# Patient Record
Sex: FEMALE | URBAN - METROPOLITAN AREA
[De-identification: names, ages, dates, MRNs, and addresses within clinical notes are randomized per-mention and may not be internally consistent; named-entity substitution may affect disease eponyms.]

---

## 2023-07-07 ENCOUNTER — APPOINTMENT (RX ONLY)
Dept: URBAN - METROPOLITAN AREA CLINIC 74 | Facility: CLINIC | Age: 53
Setting detail: DERMATOLOGY
End: 2023-07-07

## 2023-07-07 DIAGNOSIS — L30.8 OTHER SPECIFIED DERMATITIS: ICD-10-CM | Status: INADEQUATELY CONTROLLED

## 2023-07-07 DIAGNOSIS — L90.8 OTHER ATROPHIC DISORDERS OF SKIN: ICD-10-CM

## 2023-07-07 PROCEDURE — ? COUNSELING

## 2023-07-07 PROCEDURE — 99203 OFFICE O/P NEW LOW 30 MIN: CPT

## 2023-07-07 PROCEDURE — ? ORDER TESTS

## 2023-07-07 PROCEDURE — ? TREATMENT REGIMEN

## 2023-07-07 ASSESSMENT — LOCATION SIMPLE DESCRIPTION DERM
LOCATION SIMPLE: INFERIOR FOREHEAD
LOCATION SIMPLE: RIGHT HEEL

## 2023-07-07 ASSESSMENT — LOCATION DETAILED DESCRIPTION DERM
LOCATION DETAILED: RIGHT HEEL
LOCATION DETAILED: INFERIOR MID FOREHEAD

## 2023-07-07 ASSESSMENT — LOCATION ZONE DERM
LOCATION ZONE: FEET
LOCATION ZONE: FACE

## 2023-07-07 NOTE — PROCEDURE: ORDER TESTS
Bill For Surgical Tray: no
Billing Type: Third-Party Bill
Expected Date Of Service: 07/07/2023
Performing Laboratory: 0
Billing Type: Third-Party Bill

## 2023-07-07 NOTE — HPI: SKIN LESIONS
Is This A New Presentation, Or A Follow-Up?: Skin Lesions
How Severe Is Your Skin Lesion?: moderate
Additional History: Patient presents for several sores across her face that have not healed for over a month. The sores are very itchy and painful. Pt says she has had this once before but not this severe. Pt reports sores started as a pimples which she popped. Thick clear pus oozed out and stuck to her skin. Pt PCP prescribed cipro which helped stop the spread of the sores. The sores scabbed up and were itchy so pt itched scab off and went to the ER where she was prescribed Bactrim and mupirocin. Pt wants sores cultured.

## 2023-07-07 NOTE — PROCEDURE: COUNSELING
Patient Specific Counseling (Will Not Stick From Patient To Patient): -pt last used mupirocin this morning, pt wants cx but was advised cx may not show anything due to recent mupirocin use\\n-per pt cipro didn’t take redness out but stopped growth/spread of sores\\n-pt has tried NewSkin with no improvement, advised to try biafine\\n-pt thinks mites or parasites are causing condition, advised that dermatologically there is no mite/parasite that could cause this
Detail Level: Detailed
Patient Specific Counseling (Will Not Stick From Patient To Patient): -pt concerned that heel needs to be drained and that there is some sort of parasite/worm in there, advised that this is just the pedal papules themselves.\\n-pt insisted on bacterial culture

## 2023-07-13 ENCOUNTER — RX ONLY (OUTPATIENT)
Age: 53
Setting detail: RX ONLY
End: 2023-07-13

## 2023-07-13 RX ORDER — EMOLLIENT COMBINATION NO.10
EMULSION (GRAM) TOPICAL
Qty: 45 | Refills: 1 | Status: ERX | COMMUNITY
Start: 2023-07-13

## 2023-08-04 ENCOUNTER — APPOINTMENT (RX ONLY)
Dept: URBAN - METROPOLITAN AREA CLINIC 74 | Facility: CLINIC | Age: 53
Setting detail: DERMATOLOGY
End: 2023-08-04

## 2023-08-04 ENCOUNTER — RX ONLY (OUTPATIENT)
Age: 53
Setting detail: RX ONLY
End: 2023-08-04

## 2023-08-04 DIAGNOSIS — L30.8 OTHER SPECIFIED DERMATITIS: ICD-10-CM

## 2023-08-04 PROCEDURE — ? SEPARATE AND IDENTIFIABLE DOCUMENTATION

## 2023-08-04 PROCEDURE — 11104 PUNCH BX SKIN SINGLE LESION: CPT

## 2023-08-04 PROCEDURE — ? COUNSELING

## 2023-08-04 PROCEDURE — ? PATIENT SPECIFIC COUNSELING

## 2023-08-04 PROCEDURE — ? BIOPSY BY PUNCH METHOD

## 2023-08-04 PROCEDURE — ? PRESCRIPTION

## 2023-08-04 PROCEDURE — 99214 OFFICE O/P EST MOD 30 MIN: CPT | Mod: 25

## 2023-08-04 RX ORDER — METRONIDAZOLE 500 MG/1
TABLET ORAL
Qty: 28 | Refills: 0 | Status: ERX | COMMUNITY
Start: 2023-08-04

## 2023-08-04 RX ORDER — IVERMECTIN 10 MG/G
CREAM TOPICAL
Qty: 45 | Refills: 0 | Status: ERX | COMMUNITY
Start: 2023-08-04

## 2023-08-04 RX ADMIN — METRONIDAZOLE: 500 TABLET ORAL at 00:00

## 2023-08-04 ASSESSMENT — LOCATION SIMPLE DESCRIPTION DERM
LOCATION SIMPLE: GLABELLA
LOCATION SIMPLE: RIGHT CHEEK
LOCATION SIMPLE: LEFT CHEEK
LOCATION SIMPLE: LEFT ZYGOMA

## 2023-08-04 ASSESSMENT — LOCATION DETAILED DESCRIPTION DERM
LOCATION DETAILED: LEFT CENTRAL ZYGOMA
LOCATION DETAILED: LEFT LATERAL MALAR CHEEK
LOCATION DETAILED: GLABELLA
LOCATION DETAILED: RIGHT CENTRAL MALAR CHEEK

## 2023-08-04 ASSESSMENT — LOCATION ZONE DERM: LOCATION ZONE: FACE

## 2023-08-04 NOTE — PROCEDURE: PATIENT SPECIFIC COUNSELING
Medications explained to pt and answered all questions. Medications given as ordered. Discussed signs/symptoms/concerns to call for; call bell within reach.
S/P Bactrim course\\nbacterial cx neg\\napplying sonafine daily\\n**clinically much improved today\\n\\nPt reports the “bandages are not affecting skin”\\nPt reports she thinks it could be “face mites”\\nBelieves not healing unless she digs \"membrane\" out of wound, however denies picking at areas\\nOffered bx in addition to starting medications pt is asking for \\nDisc r/b/sed of bx
Detail Level: Detailed

## 2023-08-14 ENCOUNTER — APPOINTMENT (RX ONLY)
Dept: URBAN - METROPOLITAN AREA CLINIC 73 | Facility: CLINIC | Age: 53
Setting detail: DERMATOLOGY
End: 2023-08-14

## 2023-08-14 DIAGNOSIS — Z48.02 ENCOUNTER FOR REMOVAL OF SUTURES: ICD-10-CM

## 2023-08-14 PROCEDURE — ? PATIENT SPECIFIC COUNSELING

## 2023-08-14 PROCEDURE — ? SUTURE REMOVAL (GLOBAL PERIOD)

## 2023-08-14 ASSESSMENT — LOCATION SIMPLE DESCRIPTION DERM: LOCATION SIMPLE: LEFT CHEEK

## 2023-08-14 ASSESSMENT — LOCATION DETAILED DESCRIPTION DERM: LOCATION DETAILED: LEFT CENTRAL MALAR CHEEK

## 2023-08-14 ASSESSMENT — LOCATION ZONE DERM: LOCATION ZONE: FACE

## 2023-08-14 NOTE — PROCEDURE: PATIENT SPECIFIC COUNSELING
-Pt reported a “new issue” on her right heel. I let the pt know this was scheduled as a post-op visit so we can not discuss it today\\n-pt was scheduled for f/u on Wed
Detail Level: Detailed

## 2023-08-14 NOTE — PROCEDURE: SUTURE REMOVAL (GLOBAL PERIOD)
Detail Level: Detailed
Add 98066 Cpt? (Important Note: In 2017 The Use Of 03723 Is Being Tracked By Cms To Determine Future Global Period Reimbursement For Global Periods): no

## 2023-08-14 NOTE — PROCEDURE: MIPS QUALITY
Quality 226: Preventive Care And Screening: Tobacco Use: Screening And Cessation Intervention: Patient screened for tobacco use and is an ex/non-smoker
Quality 130: Documentation Of Current Medications In The Medical Record: Current Medications Documented
Detail Level: Detailed
Additional Notes: Pt declined influenza vaccine at this time
Quality 110: Preventive Care And Screening: Influenza Immunization: Influenza Immunization not Administered for Documented Reasons.

## 2023-09-01 ENCOUNTER — RX ONLY (OUTPATIENT)
Age: 53
Setting detail: RX ONLY
End: 2023-09-01

## 2023-09-01 RX ORDER — IVERMECTIN 10 MG/G
CREAM TOPICAL
Qty: 45 | Refills: 0 | Status: ERX

## 2023-09-13 ENCOUNTER — APPOINTMENT (RX ONLY)
Dept: URBAN - METROPOLITAN AREA CLINIC 73 | Facility: CLINIC | Age: 53
Setting detail: DERMATOLOGY
End: 2023-09-13

## 2023-09-13 VITALS — WEIGHT: 155 LBS

## 2023-09-13 DIAGNOSIS — L30.8 OTHER SPECIFIED DERMATITIS: ICD-10-CM | Status: IMPROVED

## 2023-09-13 PROCEDURE — ? ADDITIONAL NOTES

## 2023-09-13 PROCEDURE — 99214 OFFICE O/P EST MOD 30 MIN: CPT

## 2023-09-13 PROCEDURE — ? COUNSELING

## 2023-09-13 PROCEDURE — ? PRESCRIPTION

## 2023-09-13 PROCEDURE — ? PATIENT SPECIFIC COUNSELING

## 2023-09-13 RX ORDER — DOXYCYCLINE HYCLATE 20 MG/1
TABLET, FILM COATED ORAL
Qty: 180 | Refills: 0 | Status: ERX | COMMUNITY
Start: 2023-09-13

## 2023-09-13 RX ORDER — MUPIROCIN 20 MG/G
OINTMENT TOPICAL
Qty: 15 | Refills: 3 | Status: ERX | COMMUNITY
Start: 2023-09-13

## 2023-09-13 RX ORDER — IVERMECTIN 3 MG/1
TABLET ORAL
Qty: 9 | Refills: 0 | Status: ERX | COMMUNITY
Start: 2023-09-13

## 2023-09-13 RX ADMIN — IVERMECTIN: 3 TABLET ORAL at 00:00

## 2023-09-13 RX ADMIN — MUPIROCIN: 20 OINTMENT TOPICAL at 00:00

## 2023-09-13 RX ADMIN — DOXYCYCLINE HYCLATE: 20 TABLET, FILM COATED ORAL at 00:00

## 2023-09-13 ASSESSMENT — LOCATION DETAILED DESCRIPTION DERM
LOCATION DETAILED: RIGHT CENTRAL MALAR CHEEK
LOCATION DETAILED: RIGHT HEEL
LOCATION DETAILED: GLABELLA
LOCATION DETAILED: LEFT LATERAL MALAR CHEEK

## 2023-09-13 ASSESSMENT — LOCATION ZONE DERM
LOCATION ZONE: FACE
LOCATION ZONE: FEET

## 2023-09-13 ASSESSMENT — LOCATION SIMPLE DESCRIPTION DERM
LOCATION SIMPLE: RIGHT CHEEK
LOCATION SIMPLE: GLABELLA
LOCATION SIMPLE: LEFT CHEEK
LOCATION SIMPLE: RIGHT HEEL

## 2023-09-13 NOTE — PROCEDURE: PATIENT SPECIFIC COUNSELING
**clinically much improved today - only open erosion is mid upper nasal spine\\n\\nprev bact cx in recent past negative; bx 8/23 = excoriation\\n\\npt just lost her son - getting grief counseling.\\n\\nWe will start her on subantimicrobial dosing DCN 20mg po bid *2-3mo trial and mupirocin \\n\\n-pt requesting oral ivermectin as soolantra helping but incomplete; r/b/sed, no CI; \\n-will give 13.5mg ivermectin *1 dose and then 13.5mg ivermectin 2 weeks later as trial\\n-pt reports thinks feels crawling mites or bugs occasionally though much better in general\\n\\n-pt has tried to keep hands off her face and doing a good job in general\\n\\nrtc 2mo
Detail Level: Detailed

## 2023-09-13 NOTE — PROCEDURE: ADDITIONAL NOTES
Additional Notes: Once she is healed we can discuss tx for scarring/PIE - face healing well in general
Render Risk Assessment In Note?: yes
Detail Level: Simple

## 2023-09-28 ENCOUNTER — RX ONLY (OUTPATIENT)
Age: 53
Setting detail: RX ONLY
End: 2023-09-28

## 2023-09-28 RX ORDER — IVERMECTIN 10 MG/G
CREAM TOPICAL
Qty: 45 | Refills: 3 | Status: ERX

## 2023-10-20 NOTE — PROCEDURE: BIOPSY BY PUNCH METHOD
10/20/2023   [facility] "[employer]"  N/A  For questions about this resource list or additional care needs, please contact your primary care clinic or care manager.  Phone: [phone]   Email: N/A   Address: [address] [place] [id number]   Hours: N/A        Financial Stability       Rent and mortgage payment assistance  1  [address] [place] [address] - Rent payment assistance Distance: 5.91 miles      In-Person, Phone/Virtual   [address] [facility] [place] [address]  Language: English  Hours: Mon 8:00 AM - 4:00 PM , Tue 8:00 AM - 7:00 PM , Wed - Thu 8:00 AM - 4:00 PM  Fees: Free   Phone: (987) 860-8177 Email: info@[email or web address] Website: https://[address] [place] [address].[email or web address]/resources/resource-centers/     2  Community [employer] ([facility]) [facility]  [facility] Homeless Prevention Assistance Program (FHPAP) Distance: 6.46 miles      In-Person   [address] [place] [address]  Language: English, Macedonian  Hours: Mon - Fri 8:00 AM - 4:30 PM  Fees: Free   Phone: (964) 267-7533 Email: info@[employer] Website: http://www.[employer].org          Important Numbers & Websites       Emergency Services   911  [facility]   311  Poison Control   (632) 264-5026  Suicide Prevention Lifeline   (577) 498-5907 (TALK)  Child Abuse Hotline   (987) 823-4357 (4-A-Child)  Sexual Assault Hotline   (874) 104-7203 (HOPE)  National Runaway Safeline   (877) 431-9747 (RUNAWAY)  All-Options Talkline   (721) 629-3747  Substance Abuse Referral   (181) 172-6753 (HELP)    
Detail Level: Detailed
Was A Bandage Applied: Yes
Punch Size In Mm: 5
Size Of Lesion In Cm (Optional): 0
Depth Of Punch Biopsy: dermis
Biopsy Type: H and E
Anesthesia Type: 1% lidocaine with epinephrine
Anesthesia Volume In Cc (Will Not Render If 0): 0.5
Hemostasis: None
Epidermal Sutures: 4-0 Ethilon
Wound Care: Vaseline
Dressing: pressure dressing
Suture Removal: 7 days
Patient Will Remove Sutures At Home?: No
Consent: Written consent was obtained and risks were reviewed including but not limited to scarring, infection, bleeding, scabbing, incomplete removal, nerve damage and allergy to anesthesia.
Post-Care Instructions: I reviewed with the patient in detail post-care instructions. Patient is to keep the biopsy site dry overnight, and then apply bacitracin twice daily until healed. Patient may apply hydrogen peroxide soaks to remove any crusting.
Home Suture Removal Text: Patient was provided a home suture removal kit and will remove their sutures at home.  If they have any questions or difficulties they will call the office.
Notification Instructions: Patient will be notified of biopsy results. However, patient instructed to call the office if not contacted within 2 weeks.
Billing Type: Third-Party Bill
Information: Selecting Yes will display possible errors in your note based on the variables you have selected. This validation is only offered as a suggestion for you. PLEASE NOTE THAT THE VALIDATION TEXT WILL BE REMOVED WHEN YOU FINALIZE YOUR NOTE. IF YOU WANT TO FAX A PRELIMINARY NOTE YOU WILL NEED TO TOGGLE THIS TO 'NO' IF YOU DO NOT WANT IT IN YOUR FAXED NOTE.

## 2023-11-14 ENCOUNTER — APPOINTMENT (RX ONLY)
Dept: URBAN - METROPOLITAN AREA CLINIC 73 | Facility: CLINIC | Age: 53
Setting detail: DERMATOLOGY
End: 2023-11-14

## 2023-11-14 DIAGNOSIS — L70.0 ACNE VULGARIS: ICD-10-CM

## 2023-11-14 DIAGNOSIS — L30.8 OTHER SPECIFIED DERMATITIS: ICD-10-CM | Status: RESOLVED

## 2023-11-14 DIAGNOSIS — K13.0 DISEASES OF LIPS: ICD-10-CM

## 2023-11-14 DIAGNOSIS — B35.3 TINEA PEDIS: ICD-10-CM

## 2023-11-14 PROCEDURE — ? ADDITIONAL NOTES

## 2023-11-14 PROCEDURE — ? PRESCRIPTION

## 2023-11-14 PROCEDURE — ? PATIENT SPECIFIC COUNSELING

## 2023-11-14 PROCEDURE — ? KOH PREP

## 2023-11-14 PROCEDURE — ? COUNSELING

## 2023-11-14 PROCEDURE — 87220 TISSUE EXAM FOR FUNGI: CPT

## 2023-11-14 PROCEDURE — ? TREATMENT REGIMEN

## 2023-11-14 PROCEDURE — 99214 OFFICE O/P EST MOD 30 MIN: CPT

## 2023-11-14 RX ORDER — TERBINAFINE HYDROCHLORIDE 250 MG/1
TABLET ORAL
Qty: 14 | Refills: 0 | Status: ERX | COMMUNITY
Start: 2023-11-14

## 2023-11-14 RX ORDER — KETOCONAZOLE 20 MG/G
CREAM TOPICAL
Qty: 30 | Refills: 2 | Status: ERX | COMMUNITY
Start: 2023-11-14

## 2023-11-14 RX ORDER — TRETIONIN 1 MG/G
CREAM TOPICAL
Qty: 20 | Refills: 2 | Status: ERX | COMMUNITY
Start: 2023-11-14

## 2023-11-14 RX ORDER — MUPIROCIN 20 MG/G
OINTMENT TOPICAL
Qty: 22 | Refills: 3 | Status: ERX

## 2023-11-14 RX ADMIN — TERBINAFINE HYDROCHLORIDE: 250 TABLET ORAL at 00:00

## 2023-11-14 RX ADMIN — TRETIONIN: 1 CREAM TOPICAL at 00:00

## 2023-11-14 RX ADMIN — KETOCONAZOLE: 20 CREAM TOPICAL at 00:00

## 2023-11-14 ASSESSMENT — LOCATION DETAILED DESCRIPTION DERM
LOCATION DETAILED: GLABELLA
LOCATION DETAILED: RIGHT HEEL
LOCATION DETAILED: LEFT SUPERIOR VERMILION LIP
LOCATION DETAILED: LEFT MEDIAL MALAR CHEEK
LOCATION DETAILED: RIGHT CENTRAL MALAR CHEEK
LOCATION DETAILED: LEFT LATERAL DORSAL FOOT
LOCATION DETAILED: LEFT LATERAL MALAR CHEEK

## 2023-11-14 ASSESSMENT — LOCATION SIMPLE DESCRIPTION DERM
LOCATION SIMPLE: LEFT LIP
LOCATION SIMPLE: RIGHT CHEEK
LOCATION SIMPLE: LEFT CHEEK
LOCATION SIMPLE: RIGHT HEEL
LOCATION SIMPLE: GLABELLA
LOCATION SIMPLE: LEFT FOOT

## 2023-11-14 ASSESSMENT — LOCATION ZONE DERM
LOCATION ZONE: LIP
LOCATION ZONE: FEET
LOCATION ZONE: FACE

## 2023-11-14 NOTE — PROCEDURE: TREATMENT REGIMEN
Detail Level: Zone
Initiate Treatment: ketoconazole 2 % topical cream \\nQuantity: 30.0 g  Days Supply: 30\\nSig: Apply to feet twice daily\\n\\nterbinafine HCl 250 mg tablet \\nQuantity: 14.0 Tablet\\nSig: Take 1 tablet po qd
Initiate Treatment: tretinoin 0.1 % topical cream \\nQuantity: 20.0 g  Days Supply: 30\\nSig: Apply to face qhs
Initiate Treatment: mixed hydrocortisone cream (cortibalm) , mupirocin, ketoconazole cream\\nUse mix bid x 7-10days

## 2023-11-14 NOTE — PROCEDURE: KOH PREP
Detail Level: Detailed
Cpt Desired: [address]
Showing: negative findings within normal realm
Koh Intro Text (From The.....): A KOH prep was ordered and evaluated from the
Koh Procedure Text (Tissue Harvesting Technique): A 15-blade scalpel was used to scrape the skin. The skin scrapings were placed on a glass slide, covered with a coverslip and a KOH solution was applied.

## 2023-11-14 NOTE — PROCEDURE: PATIENT SPECIFIC COUNSELING
Pt finished valtrex and ivermectin course \\n-on DCn 20mg po bid; healing well\\nPt wanted to discuss remaining \"scars\" - mainly areas of PIH and  mild true scarring; background sebaceous hyperplasia as well\\nSending a retin-a cream that pt can use long-term\\nDiscussed it’s slow progress but will help with scarring and sebaceous hyperplasia \\nSending Tretinoin 0.1% cr qhs as tolerated; spf 30+/sun protex\\nInformed pt if too drying she can decrease to qohs\\nrtc 2-3mo
Detail Level: Detailed
Informed pt it looks fungal \\n If it’s positive asked if pt would prefer oral, topicals, or both\\nPt opted for both \\nKOH came back positive for Tinea pedis \\nSending lamasil oral qd x 2 weeks and ketoconazole cream \\nDiscussed s/e or oral like liver damage rash and allergy \\nSending refills of mupirocin
Recommend pt use mixed hydrocortisone cream (cortibalm) , mupirocin, ketoconazole cream\\nUse mix bid x 7-10days

## 2023-11-14 NOTE — PROCEDURE: COUNSELING
Detail Level: Detailed
Detail Level: Simple
Erythromycin Counseling:  I discussed with the patient the risks of erythromycin including but not limited to GI upset, allergic reaction, drug rash, diarrhea, increase in liver enzymes, and yeast infections.
Include Pregnancy/Lactation Warning?: No
Azelaic Acid Counseling: Patient counseled that medicine may cause skin irritation and to avoid applying near the eyes.  In the event of skin irritation, the patient was advised to reduce the amount of the drug applied or use it less frequently.   The patient verbalized understanding of the proper use and possible adverse effects of azelaic acid.  All of the patient's questions and concerns were addressed.
Sarecycline Pregnancy And Lactation Text: This medication is Pregnancy Category D and not consider safe during pregnancy. It is also excreted in breast milk.
Benzoyl Peroxide Counseling: Patient counseled that medicine may cause skin irritation and bleach clothing.  In the event of skin irritation, the patient was advised to reduce the amount of the drug applied or use it less frequently.   The patient verbalized understanding of the proper use and possible adverse effects of benzoyl peroxide.  All of the patient's questions and concerns were addressed.
Bactrim Pregnancy And Lactation Text: This medication is Pregnancy Category D and is known to cause fetal risk.  It is also excreted in breast milk.
Topical Sulfur Applications Pregnancy And Lactation Text: This medication is Pregnancy Category C and has an unknown safety profile during pregnancy. It is unknown if this topical medication is excreted in breast milk.
Birth Control Pills Pregnancy And Lactation Text: This medication should be avoided if pregnant and for the first 30 days post-partum.
Isotretinoin Counseling: Patient should get monthly blood tests, not donate blood, not drive at night if vision affected, not share medication, and not undergo elective surgery for 6 months after tx completed. Side effects reviewed, pt to contact office should one occur.
Winlevi Pregnancy And Lactation Text: This medication is considered safe during pregnancy and breastfeeding.
Spironolactone Pregnancy And Lactation Text: This medication can cause feminization of the male fetus and should be avoided during pregnancy. The active metabolite is also found in breast milk.
Dapsone Counseling: I discussed with the patient the risks of dapsone including but not limited to hemolytic anemia, agranulocytosis, rashes, methemoglobinemia, kidney failure, peripheral neuropathy, headaches, GI upset, and liver toxicity.  Patients who start dapsone require monitoring including baseline LFTs and weekly CBCs for the first month, then every month thereafter.  The patient verbalized understanding of the proper use and possible adverse effects of dapsone.  All of the patient's questions and concerns were addressed.
Winlevi Counseling:  I discussed with the patient the risks of topical clascoterone including but not limited to erythema, scaling, itching, and stinging. Patient voiced their understanding.
Tetracycline Counseling: Patient counseled regarding possible photosensitivity and increased risk for sunburn.  Patient instructed to avoid sunlight, if possible.  When exposed to sunlight, patients should wear protective clothing, sunglasses, and sunscreen.  The patient was instructed to call the office immediately if the following severe adverse effects occur:  hearing changes, easy bruising/bleeding, severe headache, or vision changes.  The patient verbalized understanding of the proper use and possible adverse effects of tetracycline.  All of the patient's questions and concerns were addressed. Patient understands to avoid pregnancy while on therapy due to potential birth defects.
Aklief counseling:  Patient advised to apply a pea-sized amount only at bedtime and wait 30 minutes after washing their face before applying.  If too drying, patient may add a non-comedogenic moisturizer.  The most commonly reported side effects including irritation, redness, scaling, dryness, stinging, burning, itching, and increased risk of sunburn.  The patient verbalized understanding of the proper use and possible adverse effects of retinoids.  All of the patient's questions and concerns were addressed.
High Dose Vitamin A Pregnancy And Lactation Text: High dose vitamin A therapy is contraindicated during pregnancy and breast feeding.
Tazorac Pregnancy And Lactation Text: This medication is not safe during pregnancy. It is unknown if this medication is excreted in breast milk.
Azithromycin Pregnancy And Lactation Text: This medication is considered safe during pregnancy and is also secreted in breast milk.
Topical Clindamycin Pregnancy And Lactation Text: This medication is Pregnancy Category B and is considered safe during pregnancy. It is unknown if it is excreted in breast milk.
Doxycycline Counseling:  Patient counseled regarding possible photosensitivity and increased risk for sunburn.  Patient instructed to avoid sunlight, if possible.  When exposed to sunlight, patients should wear protective clothing, sunglasses, and sunscreen.  The patient was instructed to call the office immediately if the following severe adverse effects occur:  hearing changes, easy bruising/bleeding, severe headache, or vision changes.  The patient verbalized understanding of the proper use and possible adverse effects of doxycycline.  All of the patient's questions and concerns were addressed.
Detail Level: Zone
Bactrim Counseling:  I discussed with the patient the risks of sulfa antibiotics including but not limited to GI upset, allergic reaction, drug rash, diarrhea, dizziness, photosensitivity, and yeast infections.  Rarely, more serious reactions can occur including but not limited to aplastic anemia, agranulocytosis, methemoglobinemia, blood dyscrasias, liver or kidney failure, lung infiltrates or desquamative/blistering drug rashes.
Spironolactone Counseling: Patient advised regarding risks of diarrhea, abdominal pain, hyperkalemia, birth defects (for female patients), liver toxicity and renal toxicity. The patient may need blood work to monitor liver and kidney function and potassium levels while on therapy. The patient verbalized understanding of the proper use and possible adverse effects of spironolactone.  All of the patient's questions and concerns were addressed.
Topical Clindamycin Counseling: Patient counseled that this medication may cause skin irritation or allergic reactions.  In the event of skin irritation, the patient was advised to reduce the amount of the drug applied or use it less frequently.   The patient verbalized understanding of the proper use and possible adverse effects of clindamycin.  All of the patient's questions and concerns were addressed.
Azelaic Acid Pregnancy And Lactation Text: This medication is considered safe during pregnancy and breast feeding.
Sarecycline Counseling: Patient advised regarding possible photosensitivity and discoloration of the teeth, skin, lips, tongue and gums.  Patient instructed to avoid sunlight, if possible.  When exposed to sunlight, patients should wear protective clothing, sunglasses, and sunscreen.  The patient was instructed to call the office immediately if the following severe adverse effects occur:  hearing changes, easy bruising/bleeding, severe headache, or vision changes.  The patient verbalized understanding of the proper use and possible adverse effects of sarecycline.  All of the patient's questions and concerns were addressed.
Topical Sulfur Applications Counseling: Topical Sulfur Counseling: Patient counseled that this medication may cause skin irritation or allergic reactions.  In the event of skin irritation, the patient was advised to reduce the amount of the drug applied or use it less frequently.   The patient verbalized understanding of the proper use and possible adverse effects of topical sulfur application.  All of the patient's questions and concerns were addressed.
Erythromycin Pregnancy And Lactation Text: This medication is Pregnancy Category B and is considered safe during pregnancy. It is also excreted in breast milk.
Benzoyl Peroxide Pregnancy And Lactation Text: This medication is Pregnancy Category C. It is unknown if benzoyl peroxide is excreted in breast milk.
Isotretinoin Pregnancy And Lactation Text: This medication is Pregnancy Category X and is considered extremely dangerous during pregnancy. It is unknown if it is excreted in breast milk.
Topical Retinoid Pregnancy And Lactation Text: This medication is Pregnancy Category C. It is unknown if this medication is excreted in breast milk.
Birth Control Pills Counseling: Birth Control Pill Counseling: I discussed with the patient the potential side effects of OCPs including but not limited to increased risk of stroke, heart attack, thrombophlebitis, deep venous thrombosis, hepatic adenomas, breast changes, GI upset, headaches, and depression.  The patient verbalized understanding of the proper use and possible adverse effects of OCPs. All of the patient's questions and concerns were addressed.
Topical Retinoid counseling:  Patient advised to apply a pea-sized amount only at bedtime and wait 30 minutes after washing their face before applying.  If too drying, patient may add a non-comedogenic moisturizer. The patient verbalized understanding of the proper use and possible adverse effects of retinoids.  All of the patient's questions and concerns were addressed.
Tazorac Counseling:  Patient advised that medication is irritating and drying.  Patient may need to apply sparingly and wash off after an hour before eventually leaving it on overnight.  The patient verbalized understanding of the proper use and possible adverse effects of tazorac.  All of the patient's questions and concerns were addressed.
High Dose Vitamin A Counseling: Side effects reviewed, pt to contact office should one occur.
Dapsone Pregnancy And Lactation Text: This medication is Pregnancy Category C and is not considered safe during pregnancy or breast feeding.
Aklief Pregnancy And Lactation Text: It is unknown if this medication is safe to use during pregnancy.  It is unknown if this medication is excreted in breast milk.  Breastfeeding women should use the topical cream on the smallest area of the skin for the shortest time needed while breastfeeding.  Do not apply to nipple and areola.
Doxycycline Pregnancy And Lactation Text: This medication is Pregnancy Category D and not consider safe during pregnancy. It is also excreted in breast milk but is considered safe for shorter treatment courses.
Minocycline Counseling: Patient advised regarding possible photosensitivity and discoloration of the teeth, skin, lips, tongue and gums.  Patient instructed to avoid sunlight, if possible.  When exposed to sunlight, patients should wear protective clothing, sunglasses, and sunscreen.  The patient was instructed to call the office immediately if the following severe adverse effects occur:  hearing changes, easy bruising/bleeding, severe headache, or vision changes.  The patient verbalized understanding of the proper use and possible adverse effects of minocycline.  All of the patient's questions and concerns were addressed.
Azithromycin Counseling:  I discussed with the patient the risks of azithromycin including but not limited to GI upset, allergic reaction, drug rash, diarrhea, and yeast infections.

## 2024-01-09 ENCOUNTER — RX ONLY (OUTPATIENT)
Age: 54
Setting detail: RX ONLY
End: 2024-01-09

## 2024-01-09 ENCOUNTER — APPOINTMENT (RX ONLY)
Dept: URBAN - METROPOLITAN AREA CLINIC 73 | Facility: CLINIC | Age: 54
Setting detail: DERMATOLOGY
End: 2024-01-09

## 2024-01-09 DIAGNOSIS — L30.8 OTHER SPECIFIED DERMATITIS: ICD-10-CM | Status: RESOLVING

## 2024-01-09 DIAGNOSIS — L90.5 SCAR CONDITIONS AND FIBROSIS OF SKIN: ICD-10-CM | Status: INADEQUATELY CONTROLLED

## 2024-01-09 DIAGNOSIS — L70.0 ACNE VULGARIS: ICD-10-CM | Status: RESOLVING

## 2024-01-09 DIAGNOSIS — D18.0 HEMANGIOMA: ICD-10-CM

## 2024-01-09 DIAGNOSIS — K13.0 DISEASES OF LIPS: ICD-10-CM | Status: RESOLVED

## 2024-01-09 DIAGNOSIS — B35.3 TINEA PEDIS: ICD-10-CM | Status: RESOLVED

## 2024-01-09 PROBLEM — D18.01 HEMANGIOMA OF SKIN AND SUBCUTANEOUS TISSUE: Status: ACTIVE | Noted: 2024-01-09

## 2024-01-09 PROCEDURE — ? PRESCRIPTION

## 2024-01-09 PROCEDURE — 99214 OFFICE O/P EST MOD 30 MIN: CPT | Mod: 25

## 2024-01-09 PROCEDURE — ? COUNSELING

## 2024-01-09 PROCEDURE — ? PATIENT SPECIFIC COUNSELING

## 2024-01-09 PROCEDURE — 11900 INJECT SKIN LESIONS </W 7: CPT

## 2024-01-09 PROCEDURE — ? INTRALESIONAL KENALOG

## 2024-01-09 PROCEDURE — ? ADDITIONAL NOTES

## 2024-01-09 PROCEDURE — ? TREATMENT REGIMEN

## 2024-01-09 RX ORDER — TAZAROTENE 0.45 MG/G
LOTION TOPICAL
Qty: 45 | Refills: 1 | Status: ERX | COMMUNITY
Start: 2024-01-09

## 2024-01-09 RX ORDER — TAZAROTENE 0.45 MG/G
LOTION TOPICAL
Qty: 45 | Refills: 2 | Status: CANCELLED | COMMUNITY
Start: 2024-01-09

## 2024-01-09 RX ADMIN — TAZAROTENE: 0.45 LOTION TOPICAL at 00:00

## 2024-01-09 ASSESSMENT — LOCATION SIMPLE DESCRIPTION DERM
LOCATION SIMPLE: RIGHT CHEEK
LOCATION SIMPLE: LEFT UPPER LIP
LOCATION SIMPLE: GLABELLA
LOCATION SIMPLE: RIGHT HEEL
LOCATION SIMPLE: RIGHT UPPER BACK
LOCATION SIMPLE: RIGHT CHEEK
LOCATION SIMPLE: RIGHT LIP
LOCATION SIMPLE: LEFT CHEEK
LOCATION SIMPLE: LEFT FOOT
LOCATION SIMPLE: LEFT LIP

## 2024-01-09 ASSESSMENT — LOCATION DETAILED DESCRIPTION DERM
LOCATION DETAILED: RIGHT CENTRAL MALAR CHEEK
LOCATION DETAILED: RIGHT MID-UPPER BACK
LOCATION DETAILED: LEFT LATERAL DORSAL FOOT
LOCATION DETAILED: RIGHT CENTRAL MALAR CHEEK
LOCATION DETAILED: LEFT MEDIAL MALAR CHEEK
LOCATION DETAILED: GLABELLA
LOCATION DETAILED: RIGHT UPPER CUTANEOUS LIP
LOCATION DETAILED: LEFT SUPERIOR VERMILION LIP
LOCATION DETAILED: LEFT SUPERIOR VERMILION BORDER
LOCATION DETAILED: RIGHT ORAL COMMISSURE
LOCATION DETAILED: LEFT LATERAL MALAR CHEEK
LOCATION DETAILED: RIGHT HEEL

## 2024-01-09 ASSESSMENT — LOCATION ZONE DERM
LOCATION ZONE: FACE
LOCATION ZONE: FACE
LOCATION ZONE: LIP
LOCATION ZONE: TRUNK
LOCATION ZONE: FEET

## 2024-01-09 NOTE — HPI: SKIN LESION
Detail Level: Zone
Is This A New Presentation, Or A Follow-Up?: Skin Lesion
How Severe Is Your Skin Lesion?: moderate

## 2024-01-09 NOTE — PROCEDURE: TREATMENT REGIMEN
Detail Level: Zone
Discontinue Regimen: ketoconazole 2 % topical cream \\nQuantity: 30.0 g  Days Supply: 30\\nSig: Apply to feet twice daily\\n\\nterbinafine HCl 250 mg tablet \\nQuantity: 14.0 Tablet\\nSig: Take 1 tablet po qd
Plan: clear; monitor
Discontinue Regimen: tretinoin 0.1 % topical cream \\nQuantity: 20.0 g  Days Supply: 30\\nSig: Apply to face qhs
Initiate Treatment: Arazlo 0.045 % lotion \\nQuantity: 45.0 g  Days Supply: 30\\nSig: Apply a pea sized amount to face qohs/qhs as tolerated
Samples Given: Arazlo
Initiate Treatment: mixed hydrocortisone cream (cortibalm) , mupirocin, ketoconazole cream\\nUse mix bid x 7-10days

## 2024-01-09 NOTE — PROCEDURE: PATIENT SPECIFIC COUNSELING
Last ov 7-2017
off DCN 20mg po bid and topical ivermectin/valtrex\\n-on tretinoin 0.1% cr\\n-healing well\\n-2 areas of 's papules on mid nasal spine and right medial upper cheek- ok for ILK trial; 1%HC cr bid*3-5d; d/c picking and can restart tret 0.1% cr qohs/qhs asfter its healed\\n-rest of areas healed well w/ minimal scarring and improved erythema
Detail Level: Detailed
feet clear of tinea\\n-mottled purplishness likely from cold - monitor- b/l feet
Pt mentions one spot under right eye has been flaring, shes been using tret to the area \\nDiscussed open sore could be from picking or trauma \\npt opted to do cortisone injections today \\nAlso recommend pt try cortisone otc (cortibalm) 5-7days
Recommend pt use mixed hydrocortisone cream (cortibalm) , mupirocin, ketoconazole cream\\nUse mix bid x 7-10days

## 2024-01-09 NOTE — PROCEDURE: INTRALESIONAL KENALOG
How Many Mls Were Removed From The 40 Mg/Ml (1ml) Vial When Preparing The Injectable Solution?: 0
Detail Level: Detailed
Medical Necessity Clause: This procedure was medically necessary because the lesions that were treated were:\\na/s did not help/not successful
Kenalog Type Of Vial: Multiple Dose
Administered By (Optional): sarahy gomes
Concentration Of Kenalog Solution Injected (Mg/Ml): 2.0
Total Volume (Ccs): 0.15
Kenalog Preparation: Kenalog
Consent: The risks of atrophy were reviewed with the patient.
Validate Note Data When Using Inventory: Yes
Require Ndc Code?: No
Total Volume (Ccs): 0.05

## 2024-01-09 NOTE — PROCEDURE: COUNSELING
Detail Level: Detailed
Detail Level: Simple
Detail Level: Zone
Erythromycin Counseling:  I discussed with the patient the risks of erythromycin including but not limited to GI upset, allergic reaction, drug rash, diarrhea, increase in liver enzymes, and yeast infections.
Include Pregnancy/Lactation Warning?: No
Azelaic Acid Counseling: Patient counseled that medicine may cause skin irritation and to avoid applying near the eyes.  In the event of skin irritation, the patient was advised to reduce the amount of the drug applied or use it less frequently.   The patient verbalized understanding of the proper use and possible adverse effects of azelaic acid.  All of the patient's questions and concerns were addressed.
Sarecycline Pregnancy And Lactation Text: This medication is Pregnancy Category D and not consider safe during pregnancy. It is also excreted in breast milk.
Benzoyl Peroxide Counseling: Patient counseled that medicine may cause skin irritation and bleach clothing.  In the event of skin irritation, the patient was advised to reduce the amount of the drug applied or use it less frequently.   The patient verbalized understanding of the proper use and possible adverse effects of benzoyl peroxide.  All of the patient's questions and concerns were addressed.
Bactrim Pregnancy And Lactation Text: This medication is Pregnancy Category D and is known to cause fetal risk.  It is also excreted in breast milk.
Topical Sulfur Applications Pregnancy And Lactation Text: This medication is Pregnancy Category C and has an unknown safety profile during pregnancy. It is unknown if this topical medication is excreted in breast milk.
Birth Control Pills Pregnancy And Lactation Text: This medication should be avoided if pregnant and for the first 30 days post-partum.
Isotretinoin Counseling: Patient should get monthly blood tests, not donate blood, not drive at night if vision affected, not share medication, and not undergo elective surgery for 6 months after tx completed. Side effects reviewed, pt to contact office should one occur.
Winlevi Pregnancy And Lactation Text: This medication is considered safe during pregnancy and breastfeeding.
Spironolactone Pregnancy And Lactation Text: This medication can cause feminization of the male fetus and should be avoided during pregnancy. The active metabolite is also found in breast milk.
Dapsone Counseling: I discussed with the patient the risks of dapsone including but not limited to hemolytic anemia, agranulocytosis, rashes, methemoglobinemia, kidney failure, peripheral neuropathy, headaches, GI upset, and liver toxicity.  Patients who start dapsone require monitoring including baseline LFTs and weekly CBCs for the first month, then every month thereafter.  The patient verbalized understanding of the proper use and possible adverse effects of dapsone.  All of the patient's questions and concerns were addressed.
Winlevi Counseling:  I discussed with the patient the risks of topical clascoterone including but not limited to erythema, scaling, itching, and stinging. Patient voiced their understanding.
Tetracycline Counseling: Patient counseled regarding possible photosensitivity and increased risk for sunburn.  Patient instructed to avoid sunlight, if possible.  When exposed to sunlight, patients should wear protective clothing, sunglasses, and sunscreen.  The patient was instructed to call the office immediately if the following severe adverse effects occur:  hearing changes, easy bruising/bleeding, severe headache, or vision changes.  The patient verbalized understanding of the proper use and possible adverse effects of tetracycline.  All of the patient's questions and concerns were addressed. Patient understands to avoid pregnancy while on therapy due to potential birth defects.
Aklief counseling:  Patient advised to apply a pea-sized amount only at bedtime and wait 30 minutes after washing their face before applying.  If too drying, patient may add a non-comedogenic moisturizer.  The most commonly reported side effects including irritation, redness, scaling, dryness, stinging, burning, itching, and increased risk of sunburn.  The patient verbalized understanding of the proper use and possible adverse effects of retinoids.  All of the patient's questions and concerns were addressed.
High Dose Vitamin A Pregnancy And Lactation Text: High dose vitamin A therapy is contraindicated during pregnancy and breast feeding.
Tazorac Pregnancy And Lactation Text: This medication is not safe during pregnancy. It is unknown if this medication is excreted in breast milk.
Azithromycin Pregnancy And Lactation Text: This medication is considered safe during pregnancy and is also secreted in breast milk.
Topical Clindamycin Pregnancy And Lactation Text: This medication is Pregnancy Category B and is considered safe during pregnancy. It is unknown if it is excreted in breast milk.
Doxycycline Counseling:  Patient counseled regarding possible photosensitivity and increased risk for sunburn.  Patient instructed to avoid sunlight, if possible.  When exposed to sunlight, patients should wear protective clothing, sunglasses, and sunscreen.  The patient was instructed to call the office immediately if the following severe adverse effects occur:  hearing changes, easy bruising/bleeding, severe headache, or vision changes.  The patient verbalized understanding of the proper use and possible adverse effects of doxycycline.  All of the patient's questions and concerns were addressed.
Bactrim Counseling:  I discussed with the patient the risks of sulfa antibiotics including but not limited to GI upset, allergic reaction, drug rash, diarrhea, dizziness, photosensitivity, and yeast infections.  Rarely, more serious reactions can occur including but not limited to aplastic anemia, agranulocytosis, methemoglobinemia, blood dyscrasias, liver or kidney failure, lung infiltrates or desquamative/blistering drug rashes.
Spironolactone Counseling: Patient advised regarding risks of diarrhea, abdominal pain, hyperkalemia, birth defects (for female patients), liver toxicity and renal toxicity. The patient may need blood work to monitor liver and kidney function and potassium levels while on therapy. The patient verbalized understanding of the proper use and possible adverse effects of spironolactone.  All of the patient's questions and concerns were addressed.
Topical Clindamycin Counseling: Patient counseled that this medication may cause skin irritation or allergic reactions.  In the event of skin irritation, the patient was advised to reduce the amount of the drug applied or use it less frequently.   The patient verbalized understanding of the proper use and possible adverse effects of clindamycin.  All of the patient's questions and concerns were addressed.
Azelaic Acid Pregnancy And Lactation Text: This medication is considered safe during pregnancy and breast feeding.
Sarecycline Counseling: Patient advised regarding possible photosensitivity and discoloration of the teeth, skin, lips, tongue and gums.  Patient instructed to avoid sunlight, if possible.  When exposed to sunlight, patients should wear protective clothing, sunglasses, and sunscreen.  The patient was instructed to call the office immediately if the following severe adverse effects occur:  hearing changes, easy bruising/bleeding, severe headache, or vision changes.  The patient verbalized understanding of the proper use and possible adverse effects of sarecycline.  All of the patient's questions and concerns were addressed.
Topical Sulfur Applications Counseling: Topical Sulfur Counseling: Patient counseled that this medication may cause skin irritation or allergic reactions.  In the event of skin irritation, the patient was advised to reduce the amount of the drug applied or use it less frequently.   The patient verbalized understanding of the proper use and possible adverse effects of topical sulfur application.  All of the patient's questions and concerns were addressed.
Erythromycin Pregnancy And Lactation Text: This medication is Pregnancy Category B and is considered safe during pregnancy. It is also excreted in breast milk.
Benzoyl Peroxide Pregnancy And Lactation Text: This medication is Pregnancy Category C. It is unknown if benzoyl peroxide is excreted in breast milk.
Isotretinoin Pregnancy And Lactation Text: This medication is Pregnancy Category X and is considered extremely dangerous during pregnancy. It is unknown if it is excreted in breast milk.
Topical Retinoid Pregnancy And Lactation Text: This medication is Pregnancy Category C. It is unknown if this medication is excreted in breast milk.
Birth Control Pills Counseling: Birth Control Pill Counseling: I discussed with the patient the potential side effects of OCPs including but not limited to increased risk of stroke, heart attack, thrombophlebitis, deep venous thrombosis, hepatic adenomas, breast changes, GI upset, headaches, and depression.  The patient verbalized understanding of the proper use and possible adverse effects of OCPs. All of the patient's questions and concerns were addressed.
Topical Retinoid counseling:  Patient advised to apply a pea-sized amount only at bedtime and wait 30 minutes after washing their face before applying.  If too drying, patient may add a non-comedogenic moisturizer. The patient verbalized understanding of the proper use and possible adverse effects of retinoids.  All of the patient's questions and concerns were addressed.
Tazorac Counseling:  Patient advised that medication is irritating and drying.  Patient may need to apply sparingly and wash off after an hour before eventually leaving it on overnight.  The patient verbalized understanding of the proper use and possible adverse effects of tazorac.  All of the patient's questions and concerns were addressed.
High Dose Vitamin A Counseling: Side effects reviewed, pt to contact office should one occur.
Dapsone Pregnancy And Lactation Text: This medication is Pregnancy Category C and is not considered safe during pregnancy or breast feeding.
Aklief Pregnancy And Lactation Text: It is unknown if this medication is safe to use during pregnancy.  It is unknown if this medication is excreted in breast milk.  Breastfeeding women should use the topical cream on the smallest area of the skin for the shortest time needed while breastfeeding.  Do not apply to nipple and areola.
Doxycycline Pregnancy And Lactation Text: This medication is Pregnancy Category D and not consider safe during pregnancy. It is also excreted in breast milk but is considered safe for shorter treatment courses.
Minocycline Counseling: Patient advised regarding possible photosensitivity and discoloration of the teeth, skin, lips, tongue and gums.  Patient instructed to avoid sunlight, if possible.  When exposed to sunlight, patients should wear protective clothing, sunglasses, and sunscreen.  The patient was instructed to call the office immediately if the following severe adverse effects occur:  hearing changes, easy bruising/bleeding, severe headache, or vision changes.  The patient verbalized understanding of the proper use and possible adverse effects of minocycline.  All of the patient's questions and concerns were addressed.
Azithromycin Counseling:  I discussed with the patient the risks of azithromycin including but not limited to GI upset, allergic reaction, drug rash, diarrhea, and yeast infections.

## 2024-04-09 ENCOUNTER — APPOINTMENT (RX ONLY)
Dept: URBAN - METROPOLITAN AREA CLINIC 73 | Facility: CLINIC | Age: 54
Setting detail: DERMATOLOGY
End: 2024-04-09

## 2024-04-09 DIAGNOSIS — L70.5 ACNÉ EXCORIÉE: ICD-10-CM

## 2024-04-09 DIAGNOSIS — L60.3 NAIL DYSTROPHY: ICD-10-CM

## 2024-04-09 PROBLEM — L30.9 DERMATITIS, UNSPECIFIED: Status: ACTIVE | Noted: 2024-04-09

## 2024-04-09 PROBLEM — L60.9 NAIL DISORDER, UNSPECIFIED: Status: ACTIVE | Noted: 2024-04-09

## 2024-04-09 PROCEDURE — ? PRESCRIPTION

## 2024-04-09 PROCEDURE — ? PRESCRIPTION SAMPLES PROVIDED

## 2024-04-09 PROCEDURE — ? PATIENT SPECIFIC COUNSELING

## 2024-04-09 PROCEDURE — ? TREATMENT REGIMEN

## 2024-04-09 PROCEDURE — 99214 OFFICE O/P EST MOD 30 MIN: CPT

## 2024-04-09 PROCEDURE — ? COUNSELING

## 2024-04-09 PROCEDURE — ? TOPICAL RETINOID COUNSELING

## 2024-04-09 RX ORDER — TRETIONIN 0.5 MG/G
CREAM TOPICAL
Qty: 20 | Refills: 2 | Status: ERX | COMMUNITY
Start: 2024-04-09

## 2024-04-09 RX ORDER — EFINACONAZOLE 100 MG/ML
SOLUTION TOPICAL
Qty: 8 | Refills: 4 | Status: ERX | COMMUNITY
Start: 2024-04-09

## 2024-04-09 RX ORDER — DOXYCYCLINE HYCLATE 50 MG/1
TABLET, FILM COATED ORAL
Qty: 30 | Refills: 1 | Status: ERX | COMMUNITY
Start: 2024-04-09

## 2024-04-09 RX ADMIN — DOXYCYCLINE HYCLATE: 50 TABLET, FILM COATED ORAL at 00:00

## 2024-04-09 RX ADMIN — TRETIONIN: 0.5 CREAM TOPICAL at 00:00

## 2024-04-09 RX ADMIN — EFINACONAZOLE: 100 SOLUTION TOPICAL at 00:00

## 2024-04-09 ASSESSMENT — LOCATION SIMPLE DESCRIPTION DERM
LOCATION SIMPLE: RIGHT HEEL
LOCATION SIMPLE: LEFT CHEEK
LOCATION SIMPLE: GLABELLA
LOCATION SIMPLE: RIGHT CHEEK
LOCATION SIMPLE: LEFT GREAT TOE

## 2024-04-09 ASSESSMENT — LOCATION DETAILED DESCRIPTION DERM
LOCATION DETAILED: RIGHT HEEL
LOCATION DETAILED: GLABELLA
LOCATION DETAILED: LEFT LATERAL MALAR CHEEK
LOCATION DETAILED: LEFT GREAT TOENAIL
LOCATION DETAILED: RIGHT CENTRAL MALAR CHEEK

## 2024-04-09 ASSESSMENT — LOCATION ZONE DERM
LOCATION ZONE: TOENAIL
LOCATION ZONE: FACE
LOCATION ZONE: FEET

## 2024-04-09 NOTE — PROCEDURE: PRESCRIPTION SAMPLES PROVIDED
Expiration Date (Optional): 08/2024
Detail Level: Zone
Samples Given: Jublia x1
Lot/Batch Number (Optional): [id number]

## 2024-04-09 NOTE — PROCEDURE: PATIENT SPECIFIC COUNSELING
Pt reports large cystic lesions breaking out last week - pt took amoxicillin which made it better and inflammation goes down \\nPt reports washing face twice a day\\nPt reports very heightened stress due to loss of her son \\nDisc r/b/sed Tret 0.05% cr\\nPt also opted to have DCN on hand to help with major breakouts\\nRecommended BP wash and other antibacterial washes
Detail Level: Detailed
Disc r/b/sed Jublia

## 2024-04-09 NOTE — PROCEDURE: TREATMENT REGIMEN
Initiate Treatment: Tretinoin 0.05% cream: Apply a tiny amount to affected areas on face qHs/qoHs as tolerated\\n\\nDoxycycline: Take 1 tablet po qd
Detail Level: Zone
Initiate Treatment: Jublia: Apply to affected toe nails qd until resolved

## 2024-08-07 ENCOUNTER — APPOINTMENT (RX ONLY)
Dept: URBAN - METROPOLITAN AREA CLINIC 73 | Facility: CLINIC | Age: 54
Setting detail: DERMATOLOGY
End: 2024-08-07

## 2024-08-07 VITALS — WEIGHT: 162 LBS

## 2024-08-07 DIAGNOSIS — L60.3 NAIL DYSTROPHY: ICD-10-CM

## 2024-08-07 DIAGNOSIS — L98.1 FACTITIAL DERMATITIS: ICD-10-CM

## 2024-08-07 DIAGNOSIS — L71.8 OTHER ROSACEA: ICD-10-CM

## 2024-08-07 PROBLEM — L60.9 NAIL DISORDER, UNSPECIFIED: Status: ACTIVE | Noted: 2024-08-07

## 2024-08-07 PROCEDURE — ? TREATMENT REGIMEN

## 2024-08-07 PROCEDURE — ? NAIL CLIPPING FOR PAS

## 2024-08-07 PROCEDURE — ? ORDER TESTS

## 2024-08-07 PROCEDURE — ? PATIENT SPECIFIC COUNSELING

## 2024-08-07 PROCEDURE — 99214 OFFICE O/P EST MOD 30 MIN: CPT

## 2024-08-07 PROCEDURE — ? COUNSELING

## 2024-08-07 PROCEDURE — ? PRESCRIPTION

## 2024-08-07 RX ORDER — IVERMECTIN 3 MG/1
TABLET ORAL
Qty: 9 | Refills: 0 | Status: ERX

## 2024-08-07 RX ORDER — EFINACONAZOLE 100 MG/ML
SOLUTION TOPICAL
Qty: 8 | Refills: 3 | Status: ERX

## 2024-08-07 RX ORDER — DOXYCYCLINE HYCLATE 20 MG
TABLET ORAL
Qty: 180 | Refills: 0 | Status: ERX

## 2024-08-07 RX ORDER — MUPIROCIN 20 MG/G
OINTMENT TOPICAL
Qty: 22 | Refills: 1 | Status: ERX

## 2024-08-07 RX ORDER — IVERMECTIN 10 MG/G
CREAM TOPICAL
Qty: 45 | Refills: 2 | Status: ERX | COMMUNITY
Start: 2024-08-07

## 2024-08-07 RX ADMIN — IVERMECTIN: 10 CREAM TOPICAL at 00:00

## 2024-08-07 ASSESSMENT — LOCATION DETAILED DESCRIPTION DERM
LOCATION DETAILED: RIGHT GREAT TOENAIL
LOCATION DETAILED: RIGHT MEDIAL MALAR CHEEK

## 2024-08-07 ASSESSMENT — LOCATION ZONE DERM
LOCATION ZONE: FACE
LOCATION ZONE: TOENAIL

## 2024-08-07 ASSESSMENT — LOCATION SIMPLE DESCRIPTION DERM
LOCATION SIMPLE: RIGHT CHEEK
LOCATION SIMPLE: RIGHT GREAT TOE

## 2024-08-07 NOTE — PROCEDURE: ORDER TESTS
Bill For Surgical Tray: no
Performing Laboratory: 0
Expected Date Of Service: 08/07/2024
Billing Type: Third-Party Bill

## 2024-08-07 NOTE — PROCEDURE: PATIENT SPECIFIC COUNSELING
Detail Level: Detailed
- pt will continue jublia\\n- pt wants to discuss oral medication options eg oral lamisil after r/b./sed\\n- reviewed medications and interactions\\n- will do fungal cx and PAS testing first, pt had labs drawn with pcp, Elizabeth Gan and reports wnl; if + cx/pas, will retrieve
- Pt reports very heightened stress due to family issues/deaths in family\\n- avoid picking the areas\\n- will finish DCN 100mg and start dcn 20mg bid\\n- pt will use mupirocin then hydrocolloid dressing for next 2 weeks \\n- applied mupirocin and duoderm to neurotic excoriation on right cheek today.\\n-d/c picking; see pixx
- Pt requesting refills of ivermectin cream\\n- patient reports ivermectin 3mg tablets helps with flares

## 2024-08-07 NOTE — PROCEDURE: NAIL CLIPPING FOR PAS
Detail Level: Detailed
Render Path Notes In Note?: No
Lab: [id number]
Lab Facility: [id number]
Billing Type: Third-Party Bill

## 2024-08-07 NOTE — PROCEDURE: TREATMENT REGIMEN
Detail Level: Zone
Continue Regimen: Jublia apply to nails daily
Initiate Treatment: Mupirocin apply to affected areas on face qd x 10 days, apply duoderm after  \\nDCN 20mg take one tablet po bid
Initiate Treatment: Ivermectin apply to face qhs \\nIvermectin 3mg Take 4.5 tablets po on first dose then take 4.5 tablets po 2 weeks later

## 2024-08-13 ENCOUNTER — APPOINTMENT (RX ONLY)
Dept: URBAN - METROPOLITAN AREA CLINIC 73 | Facility: CLINIC | Age: 54
Setting detail: DERMATOLOGY
End: 2024-08-13

## 2024-09-25 ENCOUNTER — APPOINTMENT (RX ONLY)
Dept: URBAN - METROPOLITAN AREA CLINIC 73 | Facility: CLINIC | Age: 54
Setting detail: DERMATOLOGY
End: 2024-09-25

## 2024-09-25 ENCOUNTER — RX ONLY (OUTPATIENT)
Age: 54
Setting detail: RX ONLY
End: 2024-09-25

## 2024-09-25 DIAGNOSIS — L60.3 NAIL DYSTROPHY: ICD-10-CM

## 2024-09-25 DIAGNOSIS — L98.1 FACTITIAL DERMATITIS: ICD-10-CM | Status: IMPROVED

## 2024-09-25 PROBLEM — L60.9 NAIL DISORDER, UNSPECIFIED: Status: ACTIVE | Noted: 2024-09-25

## 2024-09-25 PROCEDURE — ? PATIENT SPECIFIC COUNSELING

## 2024-09-25 PROCEDURE — ? TREATMENT REGIMEN

## 2024-09-25 PROCEDURE — 99214 OFFICE O/P EST MOD 30 MIN: CPT

## 2024-09-25 PROCEDURE — ? COUNSELING

## 2024-09-25 PROCEDURE — ? PRESCRIPTION

## 2024-09-25 RX ORDER — DOXYCYCLINE HYCLATE 20 MG
TABLET ORAL
Qty: 90 | Refills: 1 | Status: CANCELLED

## 2024-09-25 RX ORDER — DOXYCYCLINE HYCLATE 20 MG
TABLET ORAL
Qty: 90 | Refills: 1 | Status: ERX

## 2024-09-25 ASSESSMENT — LOCATION DETAILED DESCRIPTION DERM: LOCATION DETAILED: RIGHT MEDIAL MALAR CHEEK

## 2024-09-25 ASSESSMENT — LOCATION ZONE DERM: LOCATION ZONE: FACE

## 2024-09-25 ASSESSMENT — LOCATION SIMPLE DESCRIPTION DERM: LOCATION SIMPLE: RIGHT CHEEK

## 2024-09-25 NOTE — PROCEDURE: PATIENT SPECIFIC COUNSELING
Detail Level: Detailed
toenail dystrophy sl improved; prev fungal cx and PAS stain neg\\n-Disc could be trauma related \\nDisc could be false negative \\nDisc nail changes can form throughout the years\\nDisc looser fitting shoes \\nDisc avoiding trauma \\nDisc can repeat clipping if pt opts \\nDisc can follow up with podiatry\\nPt declined a repeat clipping today - will monitor and keep nails trimmed and wear wider/more comfy shoes
Disc not picking the area\\nDisc today appears to be healing well \\nDisc retinol for the area \\nDisc altreno r/b/sed\\nDisc will send 6 months of doxycycline today \\nDisc compulsive picking can be stress related

## 2024-09-25 NOTE — PROCEDURE: TREATMENT REGIMEN
Continue Regimen: DCN 20mg take one tablet po bid
Discontinue Regimen: Mupirocin apply to affected areas on face qd x 10 days, apply duoderm after
Detail Level: Zone
Plan: Improved- epithelialized scar w/ some erythema\\n\\n-add altreno lotion qhs; d/c picking\\n-cont DCN 20mg po bid- no se's and is helping as per pt

## 2024-10-07 ENCOUNTER — RX ONLY (OUTPATIENT)
Age: 54
Setting detail: RX ONLY
End: 2024-10-07

## 2024-10-07 RX ORDER — TRETIONIN 0.5 MG/G
CREAM TOPICAL
Qty: 20 | Refills: 1 | Status: ERX

## 2024-12-03 ENCOUNTER — RX ONLY (OUTPATIENT)
Age: 54
Setting detail: RX ONLY
End: 2024-12-03

## 2024-12-03 RX ORDER — DOXYCYCLINE HYCLATE 20 MG
TABLET ORAL
Qty: 180 | Refills: 0 | Status: ERX

## 2025-04-02 ENCOUNTER — APPOINTMENT (OUTPATIENT)
Dept: URBAN - METROPOLITAN AREA CLINIC 73 | Facility: CLINIC | Age: 55
Setting detail: DERMATOLOGY
End: 2025-04-02

## 2025-04-02 DIAGNOSIS — L98.1 FACTITIAL DERMATITIS: ICD-10-CM

## 2025-04-02 DIAGNOSIS — L71.8 OTHER ROSACEA: ICD-10-CM

## 2025-04-02 PROCEDURE — ? PRESCRIPTION

## 2025-04-02 PROCEDURE — ? COUNSELING

## 2025-04-02 PROCEDURE — ? TREATMENT REGIMEN

## 2025-04-02 PROCEDURE — ? PATIENT SPECIFIC COUNSELING

## 2025-04-02 PROCEDURE — 99214 OFFICE O/P EST MOD 30 MIN: CPT

## 2025-04-02 RX ORDER — IVERMECTIN 10 MG/G
CREAM TOPICAL
Qty: 45 | Refills: 0 | Status: ERX

## 2025-04-02 RX ORDER — MUPIROCIN 20 MG/G
OINTMENT TOPICAL
Qty: 22 | Refills: 3 | Status: ERX

## 2025-04-02 RX ORDER — IVERMECTIN 3 MG/1
TABLET ORAL
Qty: 9 | Refills: 0 | Status: ERX

## 2025-04-02 RX ORDER — DOXYCYCLINE HYCLATE 20 MG
TABLET ORAL BID
Qty: 180 | Refills: 0 | Status: ERX

## 2025-04-02 ASSESSMENT — LOCATION DETAILED DESCRIPTION DERM
LOCATION DETAILED: RIGHT CENTRAL MALAR CHEEK
LOCATION DETAILED: RIGHT MEDIAL MALAR CHEEK
LOCATION DETAILED: LEFT CENTRAL MALAR CHEEK

## 2025-04-02 ASSESSMENT — LOCATION SIMPLE DESCRIPTION DERM
LOCATION SIMPLE: RIGHT CHEEK
LOCATION SIMPLE: LEFT CHEEK

## 2025-04-02 ASSESSMENT — LOCATION ZONE DERM: LOCATION ZONE: FACE

## 2025-04-02 NOTE — PROCEDURE: TREATMENT REGIMEN
Detail Level: Zone
Plan: pt reports DCN 20mg po bid also helps skin a lot\\n-also wants oral ivermectin as it helped in past; no se's
Initiate Treatment: Mupirocin apply to affected areas on face bid x 10 days
Continue Regimen: Apply Tret 0.025% qd nightly to face\\n\\ndoxycycline hyclate 20 mg tablet BID\\nQuantity: 180.0 Tablet  Days Supply: 90\\nSig: take 1 tablet po bid\\n\\nivermectin 1 % topical cream \\nQuantity: 45.0 g  Days Supply: 30\\nSig: Apply to face qhs\\n\\nivermectin 3 mg tablet \\nQuantity: 9.0 Tablet\\nSig: Take 4.5 tablets on first dose then take 4.5 tablets po 2 weeks later

## 2025-04-02 NOTE — PROCEDURE: PATIENT SPECIFIC COUNSELING
Pt reports a flare of pimples after she discontinued her doxycycline\\nDisc that pt should avoid picking at the areas\\nPhotos taken today\\nDisc r/b/se mupirocin\\n-feels like her stress levels are improved
Detail Level: Detailed
Pt reports she has seen more redness and pimples since discontinuing her doxycycline\\nDisc r/b/se doxy and topical or oral ivermectin\\nDisc r/b/se continuing tretinoin

## 2025-04-03 RX ORDER — IVERMECTIN 10 MG/G
CREAM TOPICAL
Qty: 45 | Refills: 0 | Status: ERX

## 2025-04-07 RX ORDER — IVERMECTIN 3 MG/1
TABLET ORAL
Qty: 9 | Refills: 0 | Status: ERX

## 2025-04-11 ENCOUNTER — RX ONLY (RX ONLY)
Age: 55
End: 2025-04-11

## 2025-04-11 RX ORDER — DOXYCYCLINE MONOHYDRATE 100 MG/1
CAPSULE ORAL
Qty: 14 | Refills: 0 | Status: ERX | COMMUNITY
Start: 2025-04-11

## 2025-04-28 ENCOUNTER — RX ONLY (RX ONLY)
Age: 55
End: 2025-04-28

## 2025-04-28 RX ORDER — DOXYCYCLINE MONOHYDRATE 100 MG/1
CAPSULE ORAL
Qty: 28 | Refills: 0 | Status: ERX

## 2025-04-28 RX ORDER — DOXYCYCLINE HYCLATE 100 MG/1
CAPSULE, GELATIN COATED ORAL
Qty: 28 | Refills: 0 | Status: CANCELLED
Stop reason: CLARIF

## 2025-05-07 ENCOUNTER — APPOINTMENT (OUTPATIENT)
Dept: URBAN - METROPOLITAN AREA CLINIC 73 | Facility: CLINIC | Age: 55
Setting detail: DERMATOLOGY
End: 2025-05-07

## 2025-05-07 DIAGNOSIS — L98.1 FACTITIAL DERMATITIS: ICD-10-CM | Status: IMPROVED

## 2025-05-07 DIAGNOSIS — L71.8 OTHER ROSACEA: ICD-10-CM

## 2025-05-07 DIAGNOSIS — L73.8 OTHER SPECIFIED FOLLICULAR DISORDERS: ICD-10-CM

## 2025-05-07 PROCEDURE — 17110 DESTRUCTION B9 LES UP TO 14: CPT

## 2025-05-07 PROCEDURE — ? TREATMENT REGIMEN

## 2025-05-07 PROCEDURE — ? PATIENT SPECIFIC COUNSELING

## 2025-05-07 PROCEDURE — ? PRESCRIPTION

## 2025-05-07 PROCEDURE — 99214 OFFICE O/P EST MOD 30 MIN: CPT | Mod: 25

## 2025-05-07 PROCEDURE — ? BENIGN DESTRUCTION

## 2025-05-07 PROCEDURE — ? COUNSELING

## 2025-05-07 RX ORDER — TRETIONIN 0.5 MG/G
CREAM TOPICAL
Qty: 20 | Refills: 3 | Status: ERX

## 2025-05-07 RX ORDER — DOXYCYCLINE HYCLATE 20 MG
TABLET ORAL
Qty: 180 | Refills: 1 | Status: ERX

## 2025-05-07 RX ORDER — MUPIROCIN 20 MG/G
OINTMENT TOPICAL
Qty: 22 | Refills: 3 | Status: ERX

## 2025-05-07 ASSESSMENT — LOCATION ZONE DERM: LOCATION ZONE: FACE

## 2025-05-07 ASSESSMENT — LOCATION DETAILED DESCRIPTION DERM
LOCATION DETAILED: LEFT CENTRAL MALAR CHEEK
LOCATION DETAILED: RIGHT MEDIAL MALAR CHEEK
LOCATION DETAILED: RIGHT CENTRAL MALAR CHEEK
LOCATION DETAILED: RIGHT INFERIOR MEDIAL FOREHEAD

## 2025-05-07 ASSESSMENT — LOCATION SIMPLE DESCRIPTION DERM
LOCATION SIMPLE: RIGHT CHEEK
LOCATION SIMPLE: RIGHT FOREHEAD
LOCATION SIMPLE: LEFT CHEEK

## 2025-05-07 NOTE — PROCEDURE: TREATMENT REGIMEN
Continue Regimen: Mupirocin apply to affected areas on face bid x 10 days
Detail Level: Zone
Continue Regimen: Tretinoin apply a thin layer to the face qhs to qohs \\nIvermectin apply to the face qhs\\nDoxycycline 20mg po bid between meals
Otc Regimen: Moisturizer

## 2025-05-07 NOTE — PROCEDURE: PATIENT SPECIFIC COUNSELING
Detail Level: Detailed
Discussed that doxycycline should be taken between meals to prevent stomach problems\\nPt gets sick on 20 mg but not on 100mg\\nDiscussed why we cannot increase dose, as it would become an antibiotic dose\\nRecommended continued use of low dose doxycycline \\nPt needs refills of tretinoin and mupirocin
Pt has sebaceous hyperplasia with heme crust\\nDiscussed r/b/se hyfrecation today\\nPt agreed to treatment
Pt has seen improvement, needs a refill of mupirocin

## 2025-05-07 NOTE — PROCEDURE: BENIGN DESTRUCTION
Medical Necessity Information: It is in your best interest to select a reason for this procedure from the list below. All of these items fulfill various CMS LCD requirements except the new and changing color options.
Medical Necessity Clause: This procedure was medically necessary because the lesions that were treated were: inflamed, irritated, tender
Render Note In Bullet Format When Appropriate: No
Anesthesia Volume In Cc: 0.8
Detail Level: Detailed
Treatment Number (Will Not Render If 0): 0
Render Post-Care Instructions In Note?: yes
Post-Care Instructions: I reviewed with the patient in detail post-care instructions. Patient is to wear sunprotection, and avoid picking at any of the treated lesions. Pt may apply Vaseline to crusted or scabbing areas.\\nRTC prn non-resolution
Number Of Freeze-Thaw Cycles: 3 freeze-thaw cycles
Consent: The patient's consent was obtained including but not limited to risks of crusting, scabbing, blistering, scarring, darker or lighter pigmentary change, recurrence, incomplete removal and infection.

## 2025-06-12 ENCOUNTER — APPOINTMENT (OUTPATIENT)
Dept: URBAN - METROPOLITAN AREA CLINIC 73 | Facility: CLINIC | Age: 55
Setting detail: DERMATOLOGY
End: 2025-06-12

## 2025-06-12 DIAGNOSIS — L71.8 OTHER ROSACEA: ICD-10-CM

## 2025-06-12 DIAGNOSIS — L73.8 OTHER SPECIFIED FOLLICULAR DISORDERS: ICD-10-CM

## 2025-06-12 PROCEDURE — ? COUNSELING

## 2025-06-12 PROCEDURE — ? PATIENT SPECIFIC COUNSELING

## 2025-06-12 PROCEDURE — ? BENIGN DESTRUCTION

## 2025-06-12 PROCEDURE — ? TREATMENT REGIMEN

## 2025-06-12 ASSESSMENT — LOCATION SIMPLE DESCRIPTION DERM
LOCATION SIMPLE: LEFT CHEEK
LOCATION SIMPLE: RIGHT CHEEK
LOCATION SIMPLE: RIGHT FOREHEAD

## 2025-06-12 ASSESSMENT — LOCATION ZONE DERM: LOCATION ZONE: FACE

## 2025-06-12 ASSESSMENT — LOCATION DETAILED DESCRIPTION DERM
LOCATION DETAILED: RIGHT CENTRAL MALAR CHEEK
LOCATION DETAILED: RIGHT INFERIOR MEDIAL FOREHEAD
LOCATION DETAILED: LEFT CENTRAL MALAR CHEEK

## 2025-06-12 NOTE — PROCEDURE: TREATMENT REGIMEN
Continue Regimen: Tretinoin apply a thin layer to the face qhs to qohs \\nIvermectin apply to the face qhs\\nDoxycycline 20mg po bid between meals
Detail Level: Zone
Otc Regimen: Moisturizer

## 2025-06-12 NOTE — PROCEDURE: PATIENT SPECIFIC COUNSELING
Detail Level: Detailed
Pt improved with short course of doxycycline 100mg bid and now on 20mg bid\\nPt will continue doxycycline 20mg bid and tretinoin, has enough medication until November\\nRtc 11/2025
Better but not gone with hyfrecation \\nPt opted for additional hyfrecation today (disc r/b/sed)

## 2025-06-27 ENCOUNTER — APPOINTMENT (OUTPATIENT)
Dept: URBAN - METROPOLITAN AREA CLINIC 73 | Facility: CLINIC | Age: 55
Setting detail: DERMATOLOGY
End: 2025-06-27

## 2025-06-27 DIAGNOSIS — L30.9 DERMATITIS, UNSPECIFIED: ICD-10-CM

## 2025-06-27 PROCEDURE — ? PATIENT SPECIFIC COUNSELING

## 2025-06-27 PROCEDURE — ? COUNSELING

## 2025-06-27 PROCEDURE — ? DEFER

## 2025-06-27 ASSESSMENT — LOCATION ZONE DERM: LOCATION ZONE: FEET

## 2025-06-27 ASSESSMENT — LOCATION SIMPLE DESCRIPTION DERM: LOCATION SIMPLE: LEFT PLANTAR SURFACE

## 2025-06-27 ASSESSMENT — LOCATION DETAILED DESCRIPTION DERM: LOCATION DETAILED: LEFT MEDIAL PLANTAR MIDFOOT

## 2025-06-27 NOTE — PROCEDURE: DEFER
X Size Of Lesion In Cm (Optional): 0
Procedure To Be Performed At Next Visit: Biopsy by punch method
Scheduling Instructions (Optional): 10 min
Detail Level: Detailed
Instructions (Optional): Work in when rash flares
Introduction Text (Please End With A Colon): The following procedure was deferred:

## 2025-06-27 NOTE — HPI: RASH
How Severe Is Your Rash?: moderate
Is This A New Presentation, Or A Follow-Up?: Rash
Additional History: Patient reports only her L foot got swollen and had red splotches on the plantar aspect. Patient noticed after a car drive to [place] (1 hr). Pt went to the ER and labs were checked. Labs were all normal. Patient saw PCP the next day. Pt